# Patient Record
Sex: FEMALE | Race: WHITE | NOT HISPANIC OR LATINO | Employment: UNEMPLOYED | URBAN - METROPOLITAN AREA
[De-identification: names, ages, dates, MRNs, and addresses within clinical notes are randomized per-mention and may not be internally consistent; named-entity substitution may affect disease eponyms.]

---

## 2017-06-23 ENCOUNTER — HISTORICAL (OUTPATIENT)
Dept: EMERGENCY MEDICINE | Facility: HOSPITAL | Age: 57
End: 2017-06-23

## 2019-01-31 ENCOUNTER — HOSPITAL ENCOUNTER (OUTPATIENT)
Dept: ADMINISTRATIVE | Facility: HOSPITAL | Age: 59
End: 2019-02-01
Attending: INTERNAL MEDICINE | Admitting: INTERNAL MEDICINE

## 2019-01-31 LAB
ABS NEUT (OLG): 4.08 X10(3)/MCL (ref 2.1–9.2)
ALBUMIN SERPL-MCNC: 4.2 GM/DL (ref 3.4–5)
ALBUMIN/GLOB SERPL: 1.2 {RATIO}
ALP SERPL-CCNC: 131 UNIT/L (ref 38–126)
ALT SERPL-CCNC: 15 UNIT/L (ref 12–78)
AST SERPL-CCNC: 11 UNIT/L (ref 15–37)
BASOPHILS # BLD AUTO: 0 X10(3)/MCL (ref 0–0.2)
BASOPHILS NFR BLD AUTO: 0 %
BILIRUB SERPL-MCNC: 0.3 MG/DL (ref 0.2–1)
BILIRUBIN DIRECT+TOT PNL SERPL-MCNC: 0.1 MG/DL (ref 0–0.2)
BILIRUBIN DIRECT+TOT PNL SERPL-MCNC: 0.2 MG/DL (ref 0–0.8)
BNP BLD-MCNC: <15 PG/ML (ref 0–100)
BNP BLD-MCNC: <15 PG/ML (ref 0–100)
BUN SERPL-MCNC: 13 MG/DL (ref 7–18)
CALCIUM SERPL-MCNC: 9.6 MG/DL (ref 8.5–10.1)
CHLORIDE SERPL-SCNC: 104 MMOL/L (ref 98–107)
CO2 SERPL-SCNC: 31 MMOL/L (ref 21–32)
CREAT SERPL-MCNC: 0.87 MG/DL (ref 0.55–1.02)
EOSINOPHIL # BLD AUTO: 0.1 X10(3)/MCL (ref 0–0.9)
EOSINOPHIL NFR BLD AUTO: 1 %
ERYTHROCYTE [DISTWIDTH] IN BLOOD BY AUTOMATED COUNT: 12.9 % (ref 11.5–17)
GLOBULIN SER-MCNC: 3.5 GM/DL (ref 2.4–3.5)
GLUCOSE SERPL-MCNC: 93 MG/DL (ref 74–106)
HCT VFR BLD AUTO: 43.7 % (ref 37–47)
HGB BLD-MCNC: 13.9 GM/DL (ref 12–16)
LYMPHOCYTES # BLD AUTO: 2.9 X10(3)/MCL (ref 0.6–4.6)
LYMPHOCYTES NFR BLD AUTO: 38 %
MCH RBC QN AUTO: 31.9 PG (ref 27–31)
MCHC RBC AUTO-ENTMCNC: 31.8 GM/DL (ref 33–36)
MCV RBC AUTO: 100.2 FL (ref 80–94)
MONOCYTES # BLD AUTO: 0.5 X10(3)/MCL (ref 0.1–1.3)
MONOCYTES NFR BLD AUTO: 6 %
NEUTROPHILS # BLD AUTO: 4.08 X10(3)/MCL (ref 2.1–9.2)
NEUTROPHILS NFR BLD AUTO: 53 %
PLATELET # BLD AUTO: 320 X10(3)/MCL (ref 130–400)
PMV BLD AUTO: 9.8 FL (ref 9.4–12.4)
POC TROPONIN: 0 NG/ML (ref 0–0.08)
POC TROPONIN: 0 NG/ML (ref 0–0.08)
POTASSIUM SERPL-SCNC: 4.8 MMOL/L (ref 3.5–5.1)
PROT SERPL-MCNC: 7.7 GM/DL (ref 6.4–8.2)
RBC # BLD AUTO: 4.36 X10(6)/MCL (ref 4.2–5.4)
SODIUM SERPL-SCNC: 140 MMOL/L (ref 136–145)
TROPONIN I SERPL-MCNC: <0.02 NG/ML (ref 0.02–0.49)
WBC # SPEC AUTO: 7.6 X10(3)/MCL (ref 4.5–11.5)

## 2019-02-01 LAB
CK MB SERPL-MCNC: <1 NG/ML (ref 0.5–3.6)
CK MB SERPL-MCNC: <1 NG/ML (ref 0.5–3.6)
CK SERPL-CCNC: 86 UNIT/L (ref 26–192)
CK SERPL-CCNC: 92 UNIT/L (ref 26–192)
TROPONIN I SERPL-MCNC: <0.02 NG/ML (ref 0.02–0.49)
TROPONIN I SERPL-MCNC: <0.02 NG/ML (ref 0.02–0.49)

## 2021-09-23 ENCOUNTER — HISTORICAL (OUTPATIENT)
Dept: LAB | Facility: HOSPITAL | Age: 61
End: 2021-09-23

## 2021-09-23 LAB
ABS NEUT (OLG): 3.45
ALBUMIN SERPL-MCNC: 4 GM/DL (ref 3.5–5)
ALBUMIN/GLOB SERPL: 1.4 RATIO (ref 1.1–2)
ALP SERPL-CCNC: 101 UNIT/L (ref 40–150)
ALT SERPL-CCNC: 15 UNIT/L (ref 0–55)
AST SERPL-CCNC: 22 UNIT/L (ref 5–34)
BASOPHILS # BLD AUTO: 0.03 X10(3)/MCL
BASOPHILS NFR BLD AUTO: 0.4 %
BILIRUB SERPL-MCNC: 0.4 MG/DL
BILIRUBIN DIRECT+TOT PNL SERPL-MCNC: 0.2 MG/DL
BILIRUBIN DIRECT+TOT PNL SERPL-MCNC: 0.2 MG/DL (ref 0–0.5)
BUN SERPL-MCNC: 16 MG/DL (ref 9.8–20.1)
CALCIUM SERPL-MCNC: 10 MG/DL (ref 8.4–10.2)
CHLORIDE SERPL-SCNC: 107 MMOL/L (ref 98–107)
CHOLEST SERPL-MCNC: 145 MG/DL
CHOLEST/HDLC SERPL: 3 {RATIO} (ref 0–5)
CO2 SERPL-SCNC: 29 MEQ/L (ref 22–29)
CREAT SERPL-MCNC: 1.18 MG/DL (ref 0.55–1.02)
EOSINOPHIL # BLD AUTO: 0.21 X10(3)/MCL
EOSINOPHIL NFR BLD AUTO: 3 %
ERYTHROCYTE [DISTWIDTH] IN BLOOD BY AUTOMATED COUNT: 13 %
GLOBULIN SER-MCNC: 2.9 GM/DL (ref 2.4–3.5)
GLUCOSE SERPL-MCNC: 104 MG/DL (ref 74–100)
HCT VFR BLD AUTO: 39.7 % (ref 34–46)
HDLC SERPL-MCNC: 44 MG/DL (ref 35–60)
HGB BLD-MCNC: 12.4 GM/DL (ref 11.3–15.4)
IMM GRANULOCYTES # BLD AUTO: 0.01 10*3/UL (ref 0–0.1)
IMM GRANULOCYTES NFR BLD AUTO: 0.1 % (ref 0–1)
LDLC SERPL CALC-MCNC: 87 MG/DL (ref 50–140)
LYMPHOCYTES # BLD AUTO: 2.59 X10(3)/MCL
LYMPHOCYTES NFR BLD AUTO: 36.7 %
MCH RBC QN AUTO: 32 PG (ref 27–33)
MCHC RBC AUTO-ENTMCNC: 31.2 GM/DL (ref 32–35)
MCV RBC AUTO: 102.6 FL (ref 81–97)
MONOCYTES # BLD AUTO: 0.77 X10(3)/MCL
MONOCYTES NFR BLD AUTO: 10.9 %
NEUTROPHILS # BLD AUTO: 3.45 X10(3)/MCL
NEUTROPHILS NFR BLD AUTO: 48.9 %
PLATELET # BLD AUTO: 263 X10(3)/MCL (ref 140–450)
PMV BLD AUTO: 10 FL
POTASSIUM SERPL-SCNC: 4 MMOL/L (ref 3.5–5.1)
PROT SERPL-MCNC: 6.9 GM/DL (ref 6.4–8.3)
RBC # BLD AUTO: 3.87 X10(6)/MCL (ref 3.9–5)
SODIUM SERPL-SCNC: 145 MMOL/L (ref 136–145)
TRIGL SERPL-MCNC: 71 MG/DL (ref 37–140)
VLDLC SERPL CALC-MCNC: 14 MG/DL
WBC # SPEC AUTO: 7.06 X10(3)/MCL (ref 3.4–9.2)

## 2022-04-30 NOTE — ED PROVIDER NOTES
Patient:   David Wallace             MRN: [id number]            FIN: [id number]               Age:   56 years     Sex:  Female     :  1960   Associated Diagnoses:   Constipation   Author:   Antonia Scott MD      Basic Information   Time seen: Date & time 2017 00:10:00.   History source: Patient.   Arrival mode: Private vehicle.   History limitation: None.   Additional information: Chief Complaint from Nursing Triage Note : Chief Complaint   2017 23:20 CDT      Chief Complaint           for 1 week pain to tailbone area.  .      History of Present Illness   The patient presents with back pain.  The onset was 1  weeks ago, gradual and at rest.  The course/duration of symptoms is constant.  Type of injury: none.  The location where the incident occurred was at home.  Location: sacral. Radiating pain: none. The character of symptoms is dull and achy.  The degree at onset was moderate.  The degree at present is moderate.  The exacerbating factor is none.  The relieving factor is none.  Risk factors consist of smoking.  Prior episodes: none.  Therapy today: over the counter medications including ibuprofen.  Associated symptoms: none.  Additional history: none.        Review of Systems   Constitutional symptoms:  Negative except as documented in HPI.   Skin symptoms:  Negative except as documented in HPI.   Eye symptoms:  Negative except as documented in HPI.   ENMT symptoms:  Negative except as documented in HPI.   Respiratory symptoms:  Negative except as documented in HPI.   Cardiovascular symptoms:  Negative except as documented in HPI.   Gastrointestinal symptoms:  Negative except as documented in HPI.   Genitourinary symptoms:  Negative except as documented in HPI.   Musculoskeletal symptoms:  Negative except as documented in HPI.   Neurologic symptoms:  Negative except as documented in HPI.   Psychiatric symptoms:  panic attacks.   Endocrine symptoms:  Negative except as documented in  HPI.   Hematologic/Lymphatic symptoms:  Negative except as documented in HPI.   Allergy/immunologic symptoms:  Negative except as documented in HPI.      Health Status   Allergies:    Allergic Reactions (Selected)  Severity Not Documented  Penicillins- No reactions were documented..   Medications:  (Selected)   Inpatient Medications  Ordered  Toradol: 30 mg, form: Injection, IM, Once, first dose 06/24/17 1:07:00 CDT, stop date 06/24/17 1:07:00 CDT, 24  Documented Medications  Documented  KlonoPIN 0.5 mg oral tablet: 0.5 mg = 1 tab(s), Oral, Daily, 0 Refill(s)  Paxil 20 mg oral tablet: 10 mg = 0.5 tab(s), Oral, Daily, # 30 tab(s), 0 Refill(s).      Past Medical/ Family/ Social History   Medical history: panic disorder, tobacco smoker.   Surgical history: hysterectomy (benign)).   Family history: Not significant.   Social history: Alcohol use: Occasionally, Tobacco use: Smokes 0.5 pack(s) per day, many years, Drug use: Denies.      Physical Examination               Vital Signs   Vital Signs   6/23/2017 23:20 CDT      Temperature Oral          36.5 DegC                             Peripheral Pulse Rate     75 bpm                             Respiratory Rate          18 br/min                             SpO2                      98 %                             Oxygen Therapy            Room air                             Systolic Blood Pressure   113 mmHg                             Diastolic Blood Pressure  75 mmHg  .   General:  Alert, no acute distress.    Skin:  Warm, no rash.    Head:  Normocephalic.   Neck:  Supple.   Eye:  Pupils are equal, round and reactive to light, extraocular movements are intact, normal conjunctiva.    Ears, nose, mouth and throat:  Tympanic membranes clear, oral mucosa moist.    Cardiovascular:  Regular rate and rhythm, No murmur, Normal peripheral perfusion.    Respiratory:  Lungs are clear to auscultation, respirations are non-labored.    Gastrointestinal:  Soft, Nontender, Non  distended, Normal bowel sounds.    Back:  Sacral: tenderness along sacral area.   Musculoskeletal:  Normal ROM, no swelling.    Neurological:  Alert and oriented to person, place, time, and situation, No focal neurological deficit observed.    Psychiatric:  Cooperative, appropriate mood & affect.       Medical Decision Making   Differential Diagnosis:  Back pain, degenerative joint disease, chronic back pain.    Orders  Launch Order Profile (Selected)   Inpatient Orders  Ordered (Exam Completed)  XR Sacrum Coccyx: Stat, 06/24/17 0:07:00 CDT, Pain, None, Stretcher, Rad Type, 06/24/17 0:07:00 CDT  Completed  Toradol: 30 mg, form: Injection, IM, Once, first dose 06/24/17 1:07:00 CDT, stop date 06/24/17 1:07:00 CDT, 24.   Radiology results:  X-ray, sacrum/coccyx, emergency physician interpretation: incidental finding of retained colonic stool.       Reexamination/ Reevaluation   Time: 6/24/2017 00:48:00 .   Course: unchanged.   Pain status: unchanged.   Assessment: exam unchanged.      Impression and Plan   Diagnosis   Constipation ([id number] K59.00)   Plan   Condition: Stable.    Disposition: Medically cleared, Discharged: Time  6/24/2017 00:48:00, to home.    Prescriptions: Launch Meds List (Selected)   Prescriptions  Prescribed  lactulose 10 g oral powder for reconstitution: 10 gm = 1 EA, Oral, BID, X 7 day(s), # 14 EA, 0 Refill(s).    Patient was given the following educational materials: Constipation, Adult, Easy-to-Read.    Limitations   Follow up with: Primary Care Physician, In: as needed.    Counseled: Patient, Regarding diagnostic results, Regarding treatment plan, Regarding prescription, Patient indicated understanding of instructions.    Orders: Launch Orders   Admit/Transfer/Discharge:  Discharge (Order): Home.

## 2022-04-30 NOTE — DISCHARGE SUMMARY
Patient:   David Wallace             MRN: [id number]            FIN: [id number]               Age:   58 years     Sex:  Female     :  1960   Associated Diagnoses:   None   Author:   Pat DALE, Gregory ARIAS      Dc to home  See H/P as DC summary

## 2022-06-08 ENCOUNTER — HOSPITAL ENCOUNTER (EMERGENCY)
Facility: HOSPITAL | Age: 62
Discharge: HOME | End: 2022-06-08
Attending: FAMILY MEDICINE
Payer: MEDICAID

## 2022-06-08 VITALS
WEIGHT: 129 LBS | OXYGEN SATURATION: 97 % | TEMPERATURE: 99 F | SYSTOLIC BLOOD PRESSURE: 118 MMHG | BODY MASS INDEX: 26 KG/M2 | DIASTOLIC BLOOD PRESSURE: 63 MMHG | RESPIRATION RATE: 16 BRPM | HEART RATE: 45 BPM | HEIGHT: 59 IN

## 2022-06-08 DIAGNOSIS — M71.22 BAKER'S CYST OF KNEE, LEFT: Primary | ICD-10-CM

## 2022-06-08 DIAGNOSIS — R52 PAIN: ICD-10-CM

## 2022-06-08 PROCEDURE — 99284 EMERGENCY DEPT VISIT MOD MDM: CPT | Mod: 25

## 2022-06-08 PROCEDURE — 96372 THER/PROPH/DIAG INJ SC/IM: CPT | Performed by: FAMILY MEDICINE

## 2022-06-08 PROCEDURE — 63600175 PHARM REV CODE 636 W HCPCS: Performed by: FAMILY MEDICINE

## 2022-06-08 RX ORDER — ATORVASTATIN CALCIUM 40 MG/1
40 TABLET, FILM COATED ORAL DAILY
COMMUNITY
Start: 2022-03-14

## 2022-06-08 RX ORDER — ALPRAZOLAM 1 MG/1
1 TABLET ORAL 3 TIMES DAILY PRN
COMMUNITY
Start: 2022-03-17

## 2022-06-08 RX ORDER — TRAMADOL HYDROCHLORIDE 50 MG/1
50 TABLET ORAL EVERY 6 HOURS PRN
Qty: 12 TABLET | Refills: 0 | Status: SHIPPED | OUTPATIENT
Start: 2022-06-08 | End: 2022-06-20

## 2022-06-08 RX ORDER — METOPROLOL SUCCINATE 50 MG/1
50 TABLET, EXTENDED RELEASE ORAL DAILY
COMMUNITY
Start: 2022-04-05

## 2022-06-08 RX ORDER — PAROXETINE HYDROCHLORIDE 20 MG/1
20 TABLET, FILM COATED ORAL DAILY
COMMUNITY
Start: 2022-05-09

## 2022-06-08 RX ADMIN — METHYLPREDNISOLONE SODIUM SUCCINATE 80 MG: 40 INJECTION, POWDER, FOR SOLUTION INTRAMUSCULAR; INTRAVENOUS at 11:06

## 2022-06-08 NOTE — ED TRIAGE NOTES
C/o left knee pain that started this morning.  No injury notes. Describes pain as shooting pain, rates 5/10.

## 2022-06-08 NOTE — ED PROVIDER NOTES
Encounter Date: 6/8/2022       History     Chief Complaint   Patient presents with    Knee Pain     PT C/O KNEE PAIN SINCE WAKING THIS AM.  DENIES INJURY, NEW OR OLD.  NO OBVIOUS SWELLING OR DEFORMITY.  AMB      This patient is a 61-year-old female that comes in for left knee pain.  She denies any trauma.  She is ambulating without difficulty.  Denies  any exertion.  States that she has had knee pain before but is very mild.    The history is provided by the patient.   General Injury   The incident occurred several days ago. The incident occurred at home. The injury mechanism is unknown. She came to the ER via by private vehicle. There is an injury to the left knee. The pain is at a severity of 7/10. It is unlikely that a foreign body is present. There have been no prior injuries to these areas. She is right-handed.     Review of patient's allergies indicates:   Allergen Reactions    Penicillins      No past medical history on file.  No past surgical history on file.  No family history on file.  Social History     Tobacco Use    Smoking status: Current Every Day Smoker     Packs/day: 0.50     Types: Cigarettes    Smokeless tobacco: Never Used     Review of Systems   Constitutional: Negative.    HENT: Negative.    Respiratory: Negative.    Cardiovascular: Negative.    Endocrine: Negative.    Musculoskeletal:        Left knee pain   Neurological: Negative.    Psychiatric/Behavioral: Negative.    All other systems reviewed and are negative.      Physical Exam     Initial Vitals [06/08/22 1052]   BP Pulse Resp Temp SpO2   126/67 (!) 50 16 98.8 °F (37.1 °C) 97 %      MAP       --         Physical Exam    Nursing note and vitals reviewed.  Constitutional: She appears well-developed.   HENT:   Head: Normocephalic.   Eyes: Pupils are equal, round, and reactive to light.   Neck:   Normal range of motion.  Cardiovascular: Normal rate, regular rhythm and normal heart sounds.   Pulmonary/Chest: Breath sounds normal.    Abdominal: Abdomen is soft.   Musculoskeletal:      Cervical back: Normal range of motion.      Left knee: Swelling and bony tenderness present. Decreased range of motion. Tenderness present over the LCL.        Legs:       Comments: Patient is having pain at the insertion of the L Cl.  Also having pain and swelling in the back of the knee.  Patient has a Baker's cyst at the back of the knee       Neurological: She is alert and oriented to person, place, and time.   Skin: Skin is warm and dry.   Psychiatric: She has a normal mood and affect.         ED Course   Procedures  Labs Reviewed - No data to display       Imaging Results          X-Ray Knee 1 or 2 View Left (Final result)  Result time 06/08/22 11:14:16    Final result by Jb France MD (06/08/22 11:14:16)                 Impression:      No acute osseous abnormality of the left knee.      Electronically signed by: Jb France  Date:    06/08/2022  Time:    11:14             Narrative:    EXAMINATION:  XR KNEE 1 OR 2 VIEW LEFT    CLINICAL HISTORY:  Pain, unspecified    TECHNIQUE:  PA and lateral views of the left knee.    COMPARISON:  None available.    FINDINGS:  No acute fracture or malalignment is identified of the left knee. There is no suprapatellar joint effusion of the left knee.  Minimal marginal osteophyte formation of the articular surface of the patella.  The joint spaces of the left knee are otherwise maintained.  There is no aggressive-appearing bone lesion or abnormal periosteal reaction. No soft tissue gas or calcification. Bone mineralization is maintained.                                 Medications   methylPREDNISolone sodium succinate injection 80 mg (has no administration in time range)     Medical Decision Making:   Initial Assessment:   Knee pain  Differential Diagnosis:   Baker's cyst, fracture, sprain  Clinical Tests:   Radiological Study: Ordered and Reviewed  ED Management:  Patient has a Baker's cyst on exam                       Clinical Impression:   Final diagnoses:  [R52] Pain  [M71.22] Baker's cyst of knee, left (Primary)          ED Disposition Condition    Discharge Stable        ED Prescriptions     Medication Sig Dispense Start Date End Date Auth. Provider    traMADoL (ULTRAM) 50 mg tablet Take 1 tablet (50 mg total) by mouth every 6 (six) hours as needed for Pain. 12 tablet 6/8/2022 6/20/2022 Sha Dennis MD        Follow-up Information     Follow up With Specialties Details Why Contact Info    PCP  Schedule an appointment as soon as possible for a visit in 2 days             Sha Dennis MD  06/08/22 0858

## 2023-07-06 ENCOUNTER — HOSPITAL ENCOUNTER (EMERGENCY)
Facility: HOSPITAL | Age: 63
Discharge: HOME | End: 2023-07-06
Attending: GENERAL PRACTICE
Payer: MEDICAID

## 2023-07-06 VITALS
BODY MASS INDEX: 26.54 KG/M2 | HEART RATE: 55 BPM | WEIGHT: 123 LBS | RESPIRATION RATE: 18 BRPM | HEIGHT: 57 IN | OXYGEN SATURATION: 99 % | DIASTOLIC BLOOD PRESSURE: 79 MMHG | SYSTOLIC BLOOD PRESSURE: 140 MMHG | TEMPERATURE: 98 F

## 2023-07-06 DIAGNOSIS — R58 ECCHYMOSIS: Primary | ICD-10-CM

## 2023-07-06 PROCEDURE — 99281 EMR DPT VST MAYX REQ PHY/QHP: CPT

## 2023-07-07 NOTE — ED PROVIDER NOTES
Encounter Date: 7/6/2023       History     Chief Complaint   Patient presents with    Leg Injury     Contusion to left inside leg onset 3 days ago      Contusion to left inside leg onset 3 days ago     The history is provided by the patient.   Review of patient's allergies indicates:   Allergen Reactions    Penicillins      No past medical history on file.  No past surgical history on file.  No family history on file.  Social History     Tobacco Use    Smoking status: Every Day     Packs/day: 0.50     Types: Cigarettes    Smokeless tobacco: Never     Review of Systems   Constitutional: Negative.    HENT: Negative.     Eyes: Negative.    Respiratory: Negative.     Cardiovascular: Negative.    Gastrointestinal: Negative.    Endocrine: Negative.    Genitourinary: Negative.    Musculoskeletal: Negative.    Skin:  Positive for wound.   Allergic/Immunologic: Negative.    Neurological: Negative.    Hematological: Negative.  Does not bruise/bleed easily.   Psychiatric/Behavioral: Negative.     All other systems reviewed and are negative.    Physical Exam     Initial Vitals [07/06/23 1954]   BP Pulse Resp Temp SpO2   (!) 140/79 (!) 55 18 98.3 °F (36.8 °C) 99 %      MAP       --         Physical Exam    Nursing note and vitals reviewed.  Constitutional: She appears well-developed and well-nourished.   HENT:   Head: Normocephalic and atraumatic.   Eyes: EOM are normal. Pupils are equal, round, and reactive to light.   Neck: Neck supple.   Normal range of motion.  Cardiovascular:  Normal rate, regular rhythm, normal heart sounds and intact distal pulses.           Pulmonary/Chest: Breath sounds normal.   Abdominal: Abdomen is soft. Bowel sounds are normal.   Musculoskeletal:      Cervical back: Normal range of motion and neck supple.     Neurological: She is alert and oriented to person, place, and time. She has normal strength. GCS score is 15. GCS eye subscore is 4. GCS verbal subscore is 5. GCS motor subscore is 6.   Skin:  Skin is dry. Bruising and ecchymosis noted.        Small 4 x 4 cm macular bruise present on the left inner thigh.  It is nontender and appears to just be a simple bruise   Psychiatric: She has a normal mood and affect. Her behavior is normal. Judgment and thought content normal.       ED Course   Procedures  Labs Reviewed - No data to display       Imaging Results    None          Medications - No data to display  Medical Decision Making:   ED Management:  Patient denies any injury to the area but this appears to be a simple bruise.  There is no raised markings or evidence of infection.  It just seems to be a simple bruise that is macular in nature.                        Clinical Impression:   Final diagnoses:  [R58] Ecchymosis (Primary)        ED Disposition Condition    Discharge Stable          ED Prescriptions    None       Follow-up Information       Follow up With Specialties Details Why Contact Info    PCP  Schedule an appointment as soon as possible for a visit in 2 days As needed              Rufino Yoder MD  07/06/23 2018

## 2023-08-21 ENCOUNTER — LAB VISIT (OUTPATIENT)
Dept: LAB | Facility: HOSPITAL | Age: 63
End: 2023-08-21
Attending: INTERNAL MEDICINE
Payer: MEDICAID

## 2023-08-21 DIAGNOSIS — R07.9 CHEST PAIN, UNSPECIFIED: Primary | ICD-10-CM

## 2023-08-21 LAB
ANION GAP SERPL CALC-SCNC: 7 MEQ/L
BUN SERPL-MCNC: 11 MG/DL (ref 9.8–20.1)
CALCIUM SERPL-MCNC: 9.4 MG/DL (ref 8.4–10.2)
CHLORIDE SERPL-SCNC: 108 MMOL/L (ref 98–107)
CO2 SERPL-SCNC: 29 MMOL/L (ref 23–31)
CREAT SERPL-MCNC: 0.82 MG/DL (ref 0.55–1.02)
CREAT/UREA NIT SERPL: 13
GFR SERPLBLD CREATININE-BSD FMLA CKD-EPI: >60 MLS/MIN/1.73/M2
GLUCOSE SERPL-MCNC: 95 MG/DL (ref 82–115)
POTASSIUM SERPL-SCNC: 3.8 MMOL/L (ref 3.5–5.1)
SODIUM SERPL-SCNC: 144 MMOL/L (ref 136–145)

## 2023-08-21 PROCEDURE — 36415 COLL VENOUS BLD VENIPUNCTURE: CPT

## 2023-08-21 PROCEDURE — 80048 BASIC METABOLIC PNL TOTAL CA: CPT

## 2024-03-06 ENCOUNTER — HOSPITAL ENCOUNTER (EMERGENCY)
Facility: HOSPITAL | Age: 64
Discharge: HOME | End: 2024-03-06
Attending: STUDENT IN AN ORGANIZED HEALTH CARE EDUCATION/TRAINING PROGRAM
Payer: MEDICAID

## 2024-03-06 VITALS
RESPIRATION RATE: 15 BRPM | DIASTOLIC BLOOD PRESSURE: 78 MMHG | TEMPERATURE: 98 F | BODY MASS INDEX: 28.57 KG/M2 | WEIGHT: 127 LBS | HEIGHT: 56 IN | HEART RATE: 86 BPM | SYSTOLIC BLOOD PRESSURE: 136 MMHG | OXYGEN SATURATION: 98 %

## 2024-03-06 DIAGNOSIS — S39.012A STRAIN OF LUMBAR REGION, INITIAL ENCOUNTER: Primary | ICD-10-CM

## 2024-03-06 PROCEDURE — 99284 EMERGENCY DEPT VISIT MOD MDM: CPT | Mod: 25

## 2024-03-06 PROCEDURE — 96372 THER/PROPH/DIAG INJ SC/IM: CPT | Performed by: STUDENT IN AN ORGANIZED HEALTH CARE EDUCATION/TRAINING PROGRAM

## 2024-03-06 PROCEDURE — 63600175 PHARM REV CODE 636 W HCPCS: Performed by: STUDENT IN AN ORGANIZED HEALTH CARE EDUCATION/TRAINING PROGRAM

## 2024-03-06 RX ORDER — KETOROLAC TROMETHAMINE 10 MG/1
10 TABLET, FILM COATED ORAL 3 TIMES DAILY
Qty: 15 TABLET | Refills: 0 | Status: SHIPPED | OUTPATIENT
Start: 2024-03-06 | End: 2024-03-11

## 2024-03-06 RX ORDER — KETOROLAC TROMETHAMINE 30 MG/ML
30 INJECTION, SOLUTION INTRAMUSCULAR; INTRAVENOUS
Status: COMPLETED | OUTPATIENT
Start: 2024-03-06 | End: 2024-03-06

## 2024-03-06 RX ADMIN — KETOROLAC TROMETHAMINE 30 MG: 30 INJECTION, SOLUTION INTRAMUSCULAR; INTRAVENOUS at 09:03

## 2024-03-07 NOTE — ED PROVIDER NOTES
Encounter Date: 3/6/2024       History     Chief Complaint   Patient presents with    Back Pain     Back pain after getting up from seated position on steps while pulling weeds from  her yard      Patient is a 63-year-old white female past medical history of hyperlipidemia who presented to the ER today due to lower back pain after weaning hours in her garden.  She states the pain does not radiate and is worse with movement better with rest.  Denies taking anything for the pain.  She denies any direct back trauma.  She denies any urinary retention, fecal incontinence, urinary incontinence, saddle anesthesia denies any other complaints.  She denies any dysuria, hematuria.      Review of patient's allergies indicates:   Allergen Reactions    Penicillins      No past medical history on file.  No past surgical history on file.  No family history on file.  Social History     Tobacco Use    Smoking status: Every Day     Current packs/day: 0.50     Types: Cigarettes    Smokeless tobacco: Never     Review of Systems   Constitutional:  Negative for chills, fatigue and fever.   HENT:  Negative for congestion, sore throat and trouble swallowing.    Eyes:  Negative for pain and visual disturbance.   Respiratory:  Negative for cough, shortness of breath and wheezing.    Cardiovascular:  Negative for chest pain and palpitations.   Gastrointestinal:  Negative for abdominal pain, blood in stool, constipation, diarrhea, nausea and vomiting.   Genitourinary:  Negative for dysuria and hematuria.   Musculoskeletal:  Positive for back pain. Negative for myalgias.   Skin:  Negative for rash and wound.   Neurological:  Negative for seizures, syncope and headaches.   Psychiatric/Behavioral:  Negative for confusion. The patient is not nervous/anxious.        Physical Exam     Initial Vitals [03/06/24 2002]   BP Pulse Resp Temp SpO2   138/83 99 18 98.1 °F (36.7 °C) 98 %      MAP       --         Physical Exam    Nursing note and vitals  reviewed.  Constitutional: She appears well-developed and well-nourished. She is not diaphoretic. No distress.   HENT:   Head: Normocephalic.   Right Ear: External ear normal.   Left Ear: External ear normal.   Nose: Nose normal.   Eyes: Conjunctivae and EOM are normal. Right eye exhibits no discharge. Left eye exhibits no discharge. No scleral icterus.   Neck:   Normal range of motion.  Cardiovascular:  Normal rate, regular rhythm and normal heart sounds.     Exam reveals no gallop and no friction rub.       No murmur heard.  Pulmonary/Chest: Breath sounds normal. No stridor. No respiratory distress. She has no wheezes. She has no rhonchi. She has no rales.   Abdominal: Abdomen is soft. She exhibits no distension. There is no abdominal tenderness. There is no rebound and no guarding.   Musculoskeletal:         General: Normal range of motion.      Cervical back: Normal range of motion.      Comments: There is no C, T, L-spine tenderness no step-off lesions.  Pain is reproducible palpation over the right paraspinal muscles a proximally L5-S1.     Neurological: She is alert.   Skin: Skin is warm. No rash noted. No erythema.   Psychiatric: She has a normal mood and affect. Her behavior is normal.         ED Course   Procedures  Labs Reviewed - No data to display       Imaging Results              X-Ray Lumbar Spine Ap And Lateral (Final result)  Result time 03/06/24 20:25:03      Final result by Dixon Beal MD (03/06/24 20:25:03)                   Impression:      As above.      Electronically signed by: Dixon Beal  Date:    03/06/2024  Time:    20:25               Narrative:    EXAMINATION:  XR LUMBAR SPINE AP AND LATERAL    CLINICAL HISTORY:  pain;    TECHNIQUE:  AP, lateral and spot images were performed of the lumbar spine.    COMPARISON:  None    FINDINGS:  Stool noted throughout the colon as would be seen with constipation.  Limited evaluation secondary to patient's body habitus.  Degenerative changes  with facet joint hypertrophy noted.  Further evaluation may be obtained with nonemergent cross-sectional imaging if needed.                                       Medications   ketorolac injection 30 mg (30 mg Intramuscular Given 3/6/24 2108)     Medical Decision Making  Differentials:  Lumbar strain, cauda equina syndrome, vertebral fracture, osteoarthritis   Historian is the patient   63-year-old female presented via POV driven by herself for lower back pain.  Patient states she was leaning forward working in the garden for   .  Of time which I presumed to be the cause.  Lumbar strain leads the differential.  X-ray showed no acute findings.  Patient does not have a ride home thus Toradol was given.  Patient voiced improvement.  Denies all red flag symptoms of cauda equina syndrome.  Rice therapy advised.  All questions answered in layman's terms and return precautions were discussed.    Amount and/or Complexity of Data Reviewed  Radiology: ordered. Decision-making details documented in ED Course.    Risk  Prescription drug management.                                      Clinical Impression:  Final diagnoses:  [S39.012A] Strain of lumbar region, initial encounter (Primary)          ED Disposition Condition    Discharge Stable          ED Prescriptions       Medication Sig Dispense Start Date End Date Auth. Provider    ketorolac (TORADOL) 10 mg tablet Take 1 tablet (10 mg total) by mouth 3 (three) times daily. for 5 days 15 tablet 3/6/2024 3/11/2024 Roney Garner MD          Follow-up Information       Follow up With Specialties Details Why Contact Info    [facility] - Emergency Dept Emergency Medicine  If symptoms worsen [address] [facility] [address]  [phone]             Roney Garner MD  03/06/24 3114

## 2024-05-19 ENCOUNTER — HOSPITAL ENCOUNTER (EMERGENCY)
Facility: HOSPITAL | Age: 64
Discharge: HOME | End: 2024-05-19
Attending: FAMILY MEDICINE
Payer: MEDICAID

## 2024-05-19 VITALS
DIASTOLIC BLOOD PRESSURE: 70 MMHG | SYSTOLIC BLOOD PRESSURE: 109 MMHG | HEART RATE: 50 BPM | RESPIRATION RATE: 16 BRPM | OXYGEN SATURATION: 96 % | HEIGHT: 56 IN | BODY MASS INDEX: 27.9 KG/M2 | WEIGHT: 124 LBS | TEMPERATURE: 98 F

## 2024-05-19 DIAGNOSIS — S93.401A SPRAIN OF RIGHT ANKLE, UNSPECIFIED LIGAMENT, INITIAL ENCOUNTER: Primary | ICD-10-CM

## 2024-05-19 DIAGNOSIS — R52 PAIN: ICD-10-CM

## 2024-05-19 PROCEDURE — 99283 EMERGENCY DEPT VISIT LOW MDM: CPT | Mod: 25

## 2024-05-19 RX ORDER — METOPROLOL SUCCINATE 25 MG/1
25 TABLET, EXTENDED RELEASE ORAL
COMMUNITY
Start: 2024-05-03

## 2024-05-19 RX ORDER — EZETIMIBE 10 MG/1
10 TABLET ORAL
COMMUNITY
Start: 2024-04-25

## 2024-05-19 RX ORDER — ISOSORBIDE MONONITRATE 30 MG/1
30 TABLET, EXTENDED RELEASE ORAL
COMMUNITY
Start: 2024-04-25

## 2024-05-20 NOTE — ED PROVIDER NOTES
Encounter Date: 5/19/2024       History     Chief Complaint   Patient presents with    Ankle Pain     C/o intermittent right ankle swelling and pain starting 2 weeks worsening yesterday. Pt denies injury. States it only hurts with movement pain 8/10. W/o movement pain is 0/10. Last dose of Meloxicam was today @ 1000 with no relief. AAOx4. Cap refill < 3. Pulses +2. Swelling to right ankle noted.      Patient presents with right ankle aching for a few days.  She has been walking up steps a graduation, and noticed that the ankles a little swollen since then.  She took some old meloxicam, which did not seem to help much.  She recalls no other trauma.    The history is provided by the patient.     Review of patient's allergies indicates:   Allergen Reactions    Penicillins      Past Medical History:   Diagnosis Date    Hypertension     Palpitations      Past Surgical History:   Procedure Laterality Date    HYSTERECTOMY       No family history on file.  Social History     Tobacco Use    Smoking status: Every Day     Current packs/day: 0.50     Types: Cigarettes    Smokeless tobacco: Never   Substance Use Topics    Alcohol use: Never    Drug use: Never     Review of Systems   All other systems reviewed and are negative.      Physical Exam     Initial Vitals [05/19/24 1924]   BP Pulse Resp Temp SpO2   131/72 (!) 58 16 98.4 °F (36.9 °C) 97 %      MAP       --         Physical Exam    Nursing note and vitals reviewed.  Constitutional: She appears well-developed and well-nourished. No distress.   Musculoskeletal:      Comments: Lateral malleolus on the right ankle appears mildly swollen minimally tender no erythema.  Ankle is stable pulses 2+ DP/PT and capillary refill less than 2 seconds sensation fully intact.     Neurological: She is alert and oriented to person, place, and time.   Psychiatric: She has a normal mood and affect.         ED Course   Procedures  Labs Reviewed - No data to display       Imaging Results               X-Ray Foot Complete Right (Final result)  Result time 05/19/24 20:05:07      Final result by Chris Robert MD (05/19/24 20:05:07)                   Impression:      No acute abnormalities are seen      Electronically signed by: Chris Robert MD  Date:    05/19/2024  Time:    20:05               Narrative:    EXAMINATION:  XR FOOT COMPLETE 3 VIEW RIGHT    CLINICAL HISTORY:  . Pain, unspecified    TECHNIQUE:  AP, lateral, and oblique views of the right foot were performed.    COMPARISON:  None    FINDINGS:  There are no fractures seen.  There is no dislocation.  There are no bony lesions noted.                                       Medications - No data to display  Medical Decision Making  Injury pattern consistent with a right ankle sprain.  We will Ace wrap ice elevation etc..                                      Clinical Impression:  Final diagnoses:  [R52] Pain  [S93.401A] Sprain of right ankle, unspecified ligament, initial encounter (Primary)          ED Disposition Condition    Discharge Stable          ED Prescriptions    None       Follow-up Information       Follow up With Specialties Details Why Contact Info    Your PCP  Call  As needed              Grayson Dewitt MD  05/19/24 2014

## 2024-05-20 NOTE — DISCHARGE INSTRUCTIONS
Wear the Ace wrap as needed for comfort.  Over-the-counter ankle splints can help. Ice pack as needed.  Tylenol may be taken as well.